# Patient Record
(demographics unavailable — no encounter records)

---

## 2024-10-24 NOTE — PLAN
[FreeTextEntry1] : The patient's history and presentation were reviewed and discussed with Dr. Baker. An assessment was conducted in collaboration with Dr. Baker, and the plan of care was formulated and discussed accordingly.  Pt seen and examined with NP, Sophie Deleon. The analysis and plan were agreed upon and implemented.

## 2024-10-24 NOTE — HISTORY OF PRESENT ILLNESS
[HPV test offered] : HPV test offered [LMP unknown] : LMP unknown [Oral Contraceptive] : uses oral contraception pills [Y] : Patient is sexually active [Monogamous (Male Partner)] : is monogamous with a male partner [N] : Patient denies prior pregnancies [unknown] : Patient is unsure of the date of her LMP [Menarche Age: ____] : age at menarche was [unfilled] [No] : Patient does not have concerns regarding sex [Currently Active] : currently active [FreeTextEntry1] : Susanne is a 28 y/o here for her annual GYN exam. Has a known dermoid cyst, previously measured at 3.03cc vol in February. She denies pain associated with the cyst. She restarted Buckley estrin after her last visit and reports feeling well. She is amenorrheic with this OCP. She is also requesting the HPV vaccine.

## 2024-10-24 NOTE — PHYSICAL EXAM
[Chaperone Present] : A chaperone was present in the examining room during all aspects of the physical examination [92754] : A chaperone was present during the pelvic exam. [Appropriately responsive] : appropriately responsive [Alert] : alert [No Acute Distress] : no acute distress [Soft] : soft [Non-tender] : non-tender [Non-distended] : non-distended [Oriented x3] : oriented x3 [Examination Of The Breasts] : a normal appearance [No Discharge] : no discharge [No Masses] : no breast masses were palpable [Labia Majora] : normal [Labia Minora] : normal [Normal] : normal [Uterine Adnexae] : normal [FreeTextEntry2] : DANNY Lozano  [FreeTextEntry6] : bilateral nipple piercings

## 2024-10-24 NOTE — PHYSICAL EXAM
[Chaperone Present] : A chaperone was present in the examining room during all aspects of the physical examination [94211] : A chaperone was present during the pelvic exam. [Appropriately responsive] : appropriately responsive [Alert] : alert [No Acute Distress] : no acute distress [Soft] : soft [Non-tender] : non-tender [Non-distended] : non-distended [Oriented x3] : oriented x3 [Examination Of The Breasts] : a normal appearance [No Discharge] : no discharge [No Masses] : no breast masses were palpable [Labia Majora] : normal [Labia Minora] : normal [Normal] : normal [Uterine Adnexae] : normal [FreeTextEntry2] : DANNY Lozano  [FreeTextEntry6] : bilateral nipple piercings

## 2024-10-24 NOTE — PROCEDURE
[Cervical Pap Smear] : cervical Pap smear [Liquid Base] : liquid base [GC & Chlamydia via Pap] : GC & Chlamydia via Pap [Tolerated Well] : the patient tolerated the procedure well [No Complications] : there were no complications [Suspected Ovarian Cyst] : suspected ovarian cyst [Transvaginal Ultrasound] : transvaginal ultrasound [L: ___ cm] : L: [unfilled] cm [W: ___cm] : W: [unfilled] cm [H: ___ cm] : H: [unfilled] cm [FreeTextEntry5] : 29cc vol  [FreeTextEntry7] : 8.43cc vol with 4.14cc vol dermoid cyst [FreeTextEntry8] : 5.82cc vol